# Patient Record
Sex: FEMALE | Race: WHITE
[De-identification: names, ages, dates, MRNs, and addresses within clinical notes are randomized per-mention and may not be internally consistent; named-entity substitution may affect disease eponyms.]

---

## 2020-01-29 ENCOUNTER — HOSPITAL ENCOUNTER (OUTPATIENT)
Dept: HOSPITAL 92 - CT | Age: 70
Discharge: HOME | End: 2020-01-29
Attending: FAMILY MEDICINE
Payer: MEDICARE

## 2020-01-29 DIAGNOSIS — D35.02: ICD-10-CM

## 2020-01-29 DIAGNOSIS — Z12.2: Primary | ICD-10-CM

## 2020-01-29 DIAGNOSIS — D35.01: ICD-10-CM

## 2020-01-29 DIAGNOSIS — I25.10: ICD-10-CM

## 2020-01-29 DIAGNOSIS — J43.9: ICD-10-CM

## 2020-01-29 DIAGNOSIS — F17.211: ICD-10-CM

## 2020-01-29 PROCEDURE — G0297 LDCT FOR LUNG CA SCREEN: HCPCS

## 2020-01-29 NOTE — CT
CT pulmonary lung scan without IV contrast



INDICATION: Lung cancer screening protocol; current smoker; 1 pack per day; smoking history of 20+ pa
ck yearpersonal history of smoking



COMPARISON: Prior CT pulmonary lung scan dated August 9, 2018



FINDINGS:



LUNGS:

Nodules\mass: No suspicious nodule demonstrated.



Emphysema: There is scattered mild emphysema



Additional findings: There are coronary artery and thoracic aortic calcifications



Mediastinum: No lymphadenopathy.



Upper abdomen: Stable bilateral adrenal adenomas.



Osseous structures: No acute abnormality.. Prominent thoracic scoliosis



IMPRESSION:



Lung-RADS Category 1:  Negative- Continue annual screening with LDCT in 12 months.



Category S: Coronary artery and thoracic aortic calcifications. Stable bilateral adrenal adenomas. St
able emphysema



Category C: Not applicable.



Reported By: Devon Hull 

Electronically Signed:  1/29/2020 11:00 AM

## 2022-10-11 ENCOUNTER — HOSPITAL ENCOUNTER (OUTPATIENT)
Dept: HOSPITAL 92 - LABBT | Age: 72
Discharge: HOME | End: 2022-10-11
Attending: SURGERY
Payer: MEDICARE

## 2022-10-11 DIAGNOSIS — Z01.812: Primary | ICD-10-CM

## 2022-10-11 DIAGNOSIS — Z20.822: ICD-10-CM

## 2022-10-11 LAB
ALBUMIN SERPL BCG-MCNC: 4.2 G/DL (ref 3.4–4.8)
ALP SERPL-CCNC: 81 U/L (ref 40–110)
ALT SERPL W P-5'-P-CCNC: 17 U/L (ref 8–55)
ANION GAP SERPL CALC-SCNC: 13 MMOL/L (ref 10–20)
AST SERPL-CCNC: 15 U/L (ref 5–34)
BASOPHILS # BLD AUTO: 0.1 10X3/UL (ref 0–0.2)
BASOPHILS NFR BLD AUTO: 1 % (ref 0–2)
BILIRUB SERPL-MCNC: 0.3 MG/DL (ref 0.2–1.2)
BUN SERPL-MCNC: 13 MG/DL (ref 9.8–20.1)
CALCIUM SERPL-MCNC: 9.5 MG/DL (ref 7.8–10.44)
CHLORIDE SERPL-SCNC: 107 MMOL/L (ref 98–107)
CO2 SERPL-SCNC: 25 MMOL/L (ref 23–31)
CREAT CL PREDICTED SERPL C-G-VRATE: 0 ML/MIN (ref 70–130)
EOSINOPHIL # BLD AUTO: 0.2 10X3/UL (ref 0–0.5)
EOSINOPHIL NFR BLD AUTO: 2.5 % (ref 0–6)
GLOBULIN SER CALC-MCNC: 2.3 G/DL (ref 2.4–3.5)
GLUCOSE SERPL-MCNC: 105 MG/DL (ref 80–115)
HGB BLD-MCNC: 13.8 G/DL (ref 12–15.5)
LYMPHOCYTES NFR BLD AUTO: 28.9 % (ref 18–47)
MCH RBC QN AUTO: 29.5 PG (ref 27–33)
MCV RBC AUTO: 91.2 FL (ref 81.6–98.3)
MONOCYTES # BLD AUTO: 0.6 10X3/UL (ref 0–1.1)
MONOCYTES NFR BLD AUTO: 7 % (ref 0–10)
NEUTROPHILS # BLD AUTO: 4.8 10X3/UL (ref 1.5–8.4)
NEUTROPHILS NFR BLD AUTO: 59.8 % (ref 40–75)
PLATELET # BLD AUTO: 338 10X3/UL (ref 150–450)
POTASSIUM SERPL-SCNC: 4.3 MMOL/L (ref 3.5–5.1)
RBC # BLD AUTO: 4.68 10X6/UL (ref 3.9–5.03)
SODIUM SERPL-SCNC: 141 MMOL/L (ref 136–145)
WBC # BLD AUTO: 8 10X3/UL (ref 3.5–10.5)

## 2022-10-11 PROCEDURE — 80053 COMPREHEN METABOLIC PANEL: CPT

## 2022-10-11 PROCEDURE — 85025 COMPLETE CBC W/AUTO DIFF WBC: CPT

## 2022-10-11 PROCEDURE — 87811 SARS-COV-2 COVID19 W/OPTIC: CPT

## 2022-10-14 ENCOUNTER — HOSPITAL ENCOUNTER (INPATIENT)
Dept: HOSPITAL 92 - SDC | Age: 72
LOS: 3 days | Discharge: HOME | DRG: 415 | End: 2022-10-17
Attending: SURGERY | Admitting: SURGERY
Payer: MEDICARE

## 2022-10-14 VITALS — BODY MASS INDEX: 28 KG/M2

## 2022-10-14 DIAGNOSIS — Z98.42: ICD-10-CM

## 2022-10-14 DIAGNOSIS — Z81.8: ICD-10-CM

## 2022-10-14 DIAGNOSIS — K43.9: ICD-10-CM

## 2022-10-14 DIAGNOSIS — Z98.890: ICD-10-CM

## 2022-10-14 DIAGNOSIS — Z98.41: ICD-10-CM

## 2022-10-14 DIAGNOSIS — Z53.31: ICD-10-CM

## 2022-10-14 DIAGNOSIS — Z79.899: ICD-10-CM

## 2022-10-14 DIAGNOSIS — M79.7: ICD-10-CM

## 2022-10-14 DIAGNOSIS — Z80.9: ICD-10-CM

## 2022-10-14 DIAGNOSIS — Z82.49: ICD-10-CM

## 2022-10-14 DIAGNOSIS — Z20.822: ICD-10-CM

## 2022-10-14 DIAGNOSIS — G89.29: ICD-10-CM
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
## 2022-10-14 DIAGNOSIS — F17.210: ICD-10-CM

## 2022-10-14 DIAGNOSIS — Z82.3: ICD-10-CM

## 2022-10-14 DIAGNOSIS — Z82.0: ICD-10-CM

## 2022-10-14 DIAGNOSIS — Z85.43: ICD-10-CM

## 2022-10-14 DIAGNOSIS — Z90.710: ICD-10-CM

## 2022-10-14 DIAGNOSIS — K91.71: ICD-10-CM

## 2022-10-14 DIAGNOSIS — K80.12: Primary | ICD-10-CM

## 2022-10-14 DIAGNOSIS — K43.2: ICD-10-CM

## 2022-10-14 DIAGNOSIS — Z71.6: ICD-10-CM

## 2022-10-14 PROCEDURE — 80076 HEPATIC FUNCTION PANEL: CPT

## 2022-10-14 PROCEDURE — C1713 ANCHOR/SCREW BN/BN,TIS/BN: HCPCS

## 2022-10-14 PROCEDURE — 80053 COMPREHEN METABOLIC PANEL: CPT

## 2022-10-14 PROCEDURE — 0FT40ZZ RESECTION OF GALLBLADDER, OPEN APPROACH: ICD-10-PCS | Performed by: SURGERY

## 2022-10-14 PROCEDURE — S0020 INJECTION, BUPIVICAINE HYDRO: HCPCS

## 2022-10-14 PROCEDURE — 0DJD4ZZ INSPECTION OF LOWER INTESTINAL TRACT, PERCUTANEOUS ENDOSCOPIC APPROACH: ICD-10-PCS | Performed by: SURGERY

## 2022-10-14 PROCEDURE — 85025 COMPLETE CBC W/AUTO DIFF WBC: CPT

## 2022-10-14 PROCEDURE — 0WQF0ZZ REPAIR ABDOMINAL WALL, OPEN APPROACH: ICD-10-PCS | Performed by: SURGERY

## 2022-10-14 PROCEDURE — 88304 TISSUE EXAM BY PATHOLOGIST: CPT

## 2022-10-14 PROCEDURE — 80048 BASIC METABOLIC PNL TOTAL CA: CPT

## 2022-10-14 PROCEDURE — 36415 COLL VENOUS BLD VENIPUNCTURE: CPT

## 2022-10-14 PROCEDURE — 0DQE0ZZ REPAIR LARGE INTESTINE, OPEN APPROACH: ICD-10-PCS | Performed by: SURGERY

## 2022-10-14 RX ADMIN — POTASSIUM CHLORIDE, DEXTROSE MONOHYDRATE AND SODIUM CHLORIDE SCH: 150; 5; 450 INJECTION, SOLUTION INTRAVENOUS at 17:44

## 2022-10-14 RX ADMIN — POTASSIUM CHLORIDE, DEXTROSE MONOHYDRATE AND SODIUM CHLORIDE SCH MLS: 150; 5; 450 INJECTION, SOLUTION INTRAVENOUS at 11:54

## 2022-10-14 RX ADMIN — HYDROCODONE BITARTRATE AND ACETAMINOPHEN PRN TAB: 10; 325 TABLET ORAL at 16:11

## 2022-10-15 LAB
ALBUMIN SERPL BCG-MCNC: 3.2 G/DL (ref 3.4–4.8)
ALP SERPL-CCNC: 61 U/L (ref 40–110)
ALT SERPL W P-5'-P-CCNC: 49 U/L (ref 8–55)
ANION GAP SERPL CALC-SCNC: 10 MMOL/L (ref 10–20)
AST SERPL-CCNC: 36 U/L (ref 5–34)
BASOPHILS # BLD AUTO: 0.1 THOU/UL (ref 0–0.2)
BASOPHILS NFR BLD AUTO: 0.4 % (ref 0–1)
BILIRUB SERPL-MCNC: 0.5 MG/DL (ref 0.2–1.2)
BUN SERPL-MCNC: 15 MG/DL (ref 9.8–20.1)
CALCIUM SERPL-MCNC: 8 MG/DL (ref 7.8–10.44)
CHLORIDE SERPL-SCNC: 105 MMOL/L (ref 98–107)
CO2 SERPL-SCNC: 27 MMOL/L (ref 23–31)
CREAT CL PREDICTED SERPL C-G-VRATE: 111 ML/MIN (ref 70–130)
EOSINOPHIL # BLD AUTO: 0.1 THOU/UL (ref 0–0.7)
EOSINOPHIL NFR BLD AUTO: 0.5 % (ref 0–10)
GLOBULIN SER CALC-MCNC: 2.2 G/DL (ref 2.4–3.5)
GLUCOSE SERPL-MCNC: 100 MG/DL (ref 80–115)
HGB BLD-MCNC: 11.4 G/DL (ref 12–16)
LYMPHOCYTES # BLD: 2.2 THOU/UL (ref 1.2–3.4)
LYMPHOCYTES NFR BLD AUTO: 16.4 % (ref 21–51)
MCH RBC QN AUTO: 29.9 PG (ref 27–31)
MCV RBC AUTO: 95.5 FL (ref 78–98)
MONOCYTES # BLD AUTO: 0.8 THOU/UL (ref 0.11–0.59)
MONOCYTES NFR BLD AUTO: 6.3 % (ref 0–10)
NEUTROPHILS # BLD AUTO: 10.1 THOU/UL (ref 1.4–6.5)
NEUTROPHILS NFR BLD AUTO: 76.4 % (ref 42–75)
PLATELET # BLD AUTO: 254 THOU/UL (ref 130–400)
POTASSIUM SERPL-SCNC: 4.5 MMOL/L (ref 3.5–5.1)
RBC # BLD AUTO: 3.82 MILL/UL (ref 4.2–5.4)
SODIUM SERPL-SCNC: 137 MMOL/L (ref 136–145)
WBC # BLD AUTO: 13.2 THOU/UL (ref 4.8–10.8)

## 2022-10-15 RX ADMIN — HYDROCODONE BITARTRATE AND ACETAMINOPHEN PRN TAB: 10; 325 TABLET ORAL at 12:37

## 2022-10-15 RX ADMIN — DOCUSATE SODIUM 50 MG AND SENNOSIDES 8.6 MG SCH TAB: 8.6; 5 TABLET, FILM COATED ORAL at 20:40

## 2022-10-15 RX ADMIN — POTASSIUM CHLORIDE, DEXTROSE MONOHYDRATE AND SODIUM CHLORIDE SCH: 150; 5; 450 INJECTION, SOLUTION INTRAVENOUS at 18:35

## 2022-10-15 RX ADMIN — POTASSIUM CHLORIDE, DEXTROSE MONOHYDRATE AND SODIUM CHLORIDE SCH: 150; 5; 450 INJECTION, SOLUTION INTRAVENOUS at 02:11

## 2022-10-16 LAB
ALBUMIN SERPL BCG-MCNC: 3.2 G/DL (ref 3.4–4.8)
ALP SERPL-CCNC: 69 U/L (ref 40–110)
ALT SERPL W P-5'-P-CCNC: 36 U/L (ref 8–55)
ANION GAP SERPL CALC-SCNC: 11 MMOL/L (ref 10–20)
AST SERPL-CCNC: 20 U/L (ref 5–34)
BASOPHILS # BLD AUTO: 0.1 THOU/UL (ref 0–0.2)
BASOPHILS NFR BLD AUTO: 0.6 % (ref 0–1)
BILIRUB DIRECT SERPL-MCNC: 0.2 MG/DL (ref 0.1–0.3)
BILIRUB SERPL-MCNC: 0.4 MG/DL (ref 0.2–1.2)
BUN SERPL-MCNC: 10 MG/DL (ref 9.8–20.1)
CALCIUM SERPL-MCNC: 8.2 MG/DL (ref 7.8–10.44)
CHLORIDE SERPL-SCNC: 106 MMOL/L (ref 98–107)
CO2 SERPL-SCNC: 24 MMOL/L (ref 23–31)
CREAT CL PREDICTED SERPL C-G-VRATE: 114 ML/MIN (ref 70–130)
EOSINOPHIL # BLD AUTO: 0.4 THOU/UL (ref 0–0.7)
EOSINOPHIL NFR BLD AUTO: 3.9 % (ref 0–10)
GLUCOSE SERPL-MCNC: 107 MG/DL (ref 80–115)
HGB BLD-MCNC: 11.3 G/DL (ref 12–16)
LYMPHOCYTES # BLD: 2.1 THOU/UL (ref 1.2–3.4)
LYMPHOCYTES NFR BLD AUTO: 20.2 % (ref 21–51)
MCH RBC QN AUTO: 29.5 PG (ref 27–31)
MCV RBC AUTO: 95.9 FL (ref 78–98)
MONOCYTES # BLD AUTO: 0.8 THOU/UL (ref 0.11–0.59)
MONOCYTES NFR BLD AUTO: 7.3 % (ref 0–10)
NEUTROPHILS # BLD AUTO: 7.1 THOU/UL (ref 1.4–6.5)
NEUTROPHILS NFR BLD AUTO: 68 % (ref 42–75)
PLATELET # BLD AUTO: 249 THOU/UL (ref 130–400)
POTASSIUM SERPL-SCNC: 4.1 MMOL/L (ref 3.5–5.1)
RBC # BLD AUTO: 3.81 MILL/UL (ref 4.2–5.4)
SODIUM SERPL-SCNC: 137 MMOL/L (ref 136–145)
WBC # BLD AUTO: 10.4 THOU/UL (ref 4.8–10.8)

## 2022-10-16 RX ADMIN — DOCUSATE SODIUM 50 MG AND SENNOSIDES 8.6 MG SCH TAB: 8.6; 5 TABLET, FILM COATED ORAL at 09:38

## 2022-10-16 RX ADMIN — HYDROCODONE BITARTRATE AND ACETAMINOPHEN PRN TAB: 10; 325 TABLET ORAL at 13:05

## 2022-10-16 RX ADMIN — DOCUSATE SODIUM 50 MG AND SENNOSIDES 8.6 MG SCH TAB: 8.6; 5 TABLET, FILM COATED ORAL at 19:36

## 2022-10-17 VITALS — DIASTOLIC BLOOD PRESSURE: 69 MMHG | TEMPERATURE: 98.4 F | SYSTOLIC BLOOD PRESSURE: 128 MMHG

## 2022-10-17 RX ADMIN — HYDROCODONE BITARTRATE AND ACETAMINOPHEN PRN TAB: 10; 325 TABLET ORAL at 16:35

## 2022-10-17 RX ADMIN — DOCUSATE SODIUM 50 MG AND SENNOSIDES 8.6 MG SCH TAB: 8.6; 5 TABLET, FILM COATED ORAL at 09:25

## 2023-09-08 ENCOUNTER — HOSPITAL ENCOUNTER (EMERGENCY)
Dept: HOSPITAL 92 - CSHERS | Age: 73
Discharge: HOME | End: 2023-09-08
Payer: MEDICARE

## 2023-09-08 DIAGNOSIS — S61.210D: Primary | ICD-10-CM
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
## 2023-09-08 DIAGNOSIS — Z87.891: ICD-10-CM

## 2023-09-08 DIAGNOSIS — L03.011: ICD-10-CM

## 2023-09-08 PROCEDURE — 90471 IMMUNIZATION ADMIN: CPT

## 2023-09-08 PROCEDURE — 90715 TDAP VACCINE 7 YRS/> IM: CPT
